# Patient Record
Sex: FEMALE | Race: WHITE | URBAN - METROPOLITAN AREA
[De-identification: names, ages, dates, MRNs, and addresses within clinical notes are randomized per-mention and may not be internally consistent; named-entity substitution may affect disease eponyms.]

---

## 2017-01-30 DIAGNOSIS — Q28.2 AVM (ARTERIOVENOUS MALFORMATION) BRAIN: Primary | ICD-10-CM

## 2017-02-01 ENCOUNTER — CARE COORDINATION (OUTPATIENT)
Dept: NEUROLOGY | Facility: CLINIC | Age: 62
End: 2017-02-01

## 2017-02-01 NOTE — PROGRESS NOTES
Patient scheduled for a cerebral angiogram for radiosurgery on 3/23/17 at 9:00 AM Informed patient: Do not eat after 1:00 AM the morning of the procedure; you may drink clear liquids (includes coffee and tea without dairy and fruit juice) until 7:00 AM or until when the gamma knife nurse instructs (whichever is earlier). You may take your medications with a sip of water the morning of the procedure.    Patient informed of above via  Interpretive Services.     Per Dr. Fernandez - [place] may obtain consent via phone as patient does not need to return to clinic prior to procedure. Per patient - may contact via  or may discuss with her Niece, Jamila, who lives with her. [place] informed.

## 2017-03-23 ENCOUNTER — HOSPITAL ENCOUNTER (OUTPATIENT)
Facility: CLINIC | Age: 62
Discharge: STILL ADMITTED OR ADMINISTRATIVE | End: 2017-03-23
Attending: NEUROLOGICAL SURGERY | Admitting: NEUROLOGICAL SURGERY
Payer: COMMERCIAL

## 2017-03-23 ENCOUNTER — HOSPITAL ENCOUNTER (OUTPATIENT)
Dept: MRI IMAGING | Facility: CLINIC | Age: 62
End: 2017-03-23
Attending: RADIOLOGY
Payer: COMMERCIAL

## 2017-03-23 ENCOUNTER — APPOINTMENT (OUTPATIENT)
Dept: MEDSURG UNIT | Facility: CLINIC | Age: 62
End: 2017-03-23
Attending: RADIOLOGY
Payer: COMMERCIAL

## 2017-03-23 ENCOUNTER — OFFICE VISIT (OUTPATIENT)
Dept: RADIATION ONCOLOGY | Facility: CLINIC | Age: 62
End: 2017-03-23
Attending: RADIOLOGY
Payer: COMMERCIAL

## 2017-03-23 ENCOUNTER — APPOINTMENT (OUTPATIENT)
Dept: INTERVENTIONAL RADIOLOGY/VASCULAR | Facility: CLINIC | Age: 62
End: 2017-03-23
Attending: NEUROLOGICAL SURGERY
Payer: COMMERCIAL

## 2017-03-23 VITALS
DIASTOLIC BLOOD PRESSURE: 58 MMHG | RESPIRATION RATE: 16 BRPM | SYSTOLIC BLOOD PRESSURE: 128 MMHG | HEART RATE: 66 BPM | HEIGHT: 59 IN | OXYGEN SATURATION: 93 %

## 2017-03-23 VITALS
OXYGEN SATURATION: 94 % | TEMPERATURE: 97.5 F | SYSTOLIC BLOOD PRESSURE: 128 MMHG | HEART RATE: 68 BPM | DIASTOLIC BLOOD PRESSURE: 64 MMHG | RESPIRATION RATE: 16 BRPM

## 2017-03-23 VITALS
OXYGEN SATURATION: 93 % | RESPIRATION RATE: 16 BRPM | DIASTOLIC BLOOD PRESSURE: 79 MMHG | SYSTOLIC BLOOD PRESSURE: 152 MMHG | HEART RATE: 83 BPM

## 2017-03-23 DIAGNOSIS — Q28.2 ARTERIOVENOUS MALFORMATION OF BRAIN: Primary | ICD-10-CM

## 2017-03-23 DIAGNOSIS — Q28.2 AVM (ARTERIOVENOUS MALFORMATION) BRAIN: ICD-10-CM

## 2017-03-23 LAB
ANION GAP SERPL CALCULATED.3IONS-SCNC: 9 MMOL/L (ref 3–14)
APTT PPP: 27 SEC (ref 22–37)
CHLORIDE SERPL-SCNC: 107 MMOL/L (ref 94–109)
CO2 SERPL-SCNC: 25 MMOL/L (ref 20–32)
CREAT SERPL-MCNC: 0.56 MG/DL (ref 0.52–1.04)
ERYTHROCYTE [DISTWIDTH] IN BLOOD BY AUTOMATED COUNT: 13 % (ref 10–15)
GFR SERPL CREATININE-BSD FRML MDRD: NORMAL ML/MIN/1.7M2
HCT VFR BLD AUTO: 37.4 % (ref 35–47)
HGB BLD-MCNC: 12.3 G/DL (ref 11.7–15.7)
INR PPP: 1.03 (ref 0.86–1.14)
MCH RBC QN AUTO: 29.3 PG (ref 26.5–33)
MCHC RBC AUTO-ENTMCNC: 32.9 G/DL (ref 31.5–36.5)
MCV RBC AUTO: 89 FL (ref 78–100)
PLATELET # BLD AUTO: 224 10E9/L (ref 150–450)
POTASSIUM SERPL-SCNC: 3.6 MMOL/L (ref 3.4–5.3)
RBC # BLD AUTO: 4.2 10E12/L (ref 3.8–5.2)
SODIUM SERPL-SCNC: 141 MMOL/L (ref 133–144)
WBC # BLD AUTO: 3.6 10E9/L (ref 4–11)

## 2017-03-23 PROCEDURE — C1887 CATHETER, GUIDING: HCPCS

## 2017-03-23 PROCEDURE — 25500064 ZZH RX 255 OP 636: Performed by: RADIOLOGY

## 2017-03-23 PROCEDURE — 80051 ELECTROLYTE PANEL: CPT | Performed by: RADIOLOGY

## 2017-03-23 PROCEDURE — 25000125 ZZHC RX 250: Performed by: RADIOLOGY

## 2017-03-23 PROCEDURE — 77300 RADIATION THERAPY DOSE PLAN: CPT | Performed by: RADIOLOGY

## 2017-03-23 PROCEDURE — 85730 THROMBOPLASTIN TIME PARTIAL: CPT | Performed by: RADIOLOGY

## 2017-03-23 PROCEDURE — 25000128 H RX IP 250 OP 636: Performed by: RADIOLOGY

## 2017-03-23 PROCEDURE — 36224 PLACE CATH CAROTD ART: CPT

## 2017-03-23 PROCEDURE — 27210805 ZZH SHEATH CR4

## 2017-03-23 PROCEDURE — 25500064 ZZH RX 255 OP 636: Performed by: NEUROLOGICAL SURGERY

## 2017-03-23 PROCEDURE — 77295 3-D RADIOTHERAPY PLAN: CPT | Performed by: RADIOLOGY

## 2017-03-23 PROCEDURE — 25000132 ZZH RX MED GY IP 250 OP 250 PS 637: Mod: ZF | Performed by: RADIOLOGY

## 2017-03-23 PROCEDURE — 85610 PROTHROMBIN TIME: CPT | Performed by: RADIOLOGY

## 2017-03-23 PROCEDURE — 77334 RADIATION TREATMENT AID(S): CPT | Performed by: RADIOLOGY

## 2017-03-23 PROCEDURE — C1769 GUIDE WIRE: HCPCS

## 2017-03-23 PROCEDURE — 36415 COLL VENOUS BLD VENIPUNCTURE: CPT | Performed by: RADIOLOGY

## 2017-03-23 PROCEDURE — 27210995 ZZH RX 272: Performed by: RADIOLOGY

## 2017-03-23 PROCEDURE — 40000168 ZZH STATISTIC PP CARE STAGE 3

## 2017-03-23 PROCEDURE — 27210907 ZZH KIT CR9

## 2017-03-23 PROCEDURE — 85027 COMPLETE CBC AUTOMATED: CPT | Performed by: RADIOLOGY

## 2017-03-23 PROCEDURE — 99153 MOD SED SAME PHYS/QHP EA: CPT | Mod: 59

## 2017-03-23 PROCEDURE — 70552 MRI BRAIN STEM W/DYE: CPT

## 2017-03-23 PROCEDURE — 77371 SRS MULTISOURCE: CPT | Performed by: RADIOLOGY

## 2017-03-23 PROCEDURE — 99152 MOD SED SAME PHYS/QHP 5/>YRS: CPT | Mod: 59

## 2017-03-23 PROCEDURE — C1760 CLOSURE DEV, VASC: HCPCS

## 2017-03-23 PROCEDURE — A9585 GADOBUTROL INJECTION: HCPCS | Performed by: RADIOLOGY

## 2017-03-23 PROCEDURE — 82565 ASSAY OF CREATININE: CPT | Performed by: RADIOLOGY

## 2017-03-23 PROCEDURE — T1013 SIGN LANG/ORAL INTERPRETER: HCPCS | Mod: U3,ZF

## 2017-03-23 PROCEDURE — 25000125 ZZHC RX 250: Mod: ZF | Performed by: RADIOLOGY

## 2017-03-23 PROCEDURE — 77370 RADIATION PHYSICS CONSULT: CPT | Performed by: RADIOLOGY

## 2017-03-23 RX ORDER — IODIXANOL 320 MG/ML
150 INJECTION, SOLUTION INTRAVASCULAR ONCE
Status: COMPLETED | OUTPATIENT
Start: 2017-03-23 | End: 2017-03-23

## 2017-03-23 RX ORDER — HYDROCODONE BITARTRATE AND ACETAMINOPHEN 5; 325 MG/1; MG/1
1-2 TABLET ORAL EVERY 6 HOURS PRN
Status: DISCONTINUED | OUTPATIENT
Start: 2017-03-23 | End: 2017-03-23 | Stop reason: HOSPADM

## 2017-03-23 RX ORDER — ONDANSETRON 2 MG/ML
4 INJECTION INTRAMUSCULAR; INTRAVENOUS EVERY 6 HOURS PRN
Status: DISCONTINUED | OUTPATIENT
Start: 2017-03-23 | End: 2017-03-23 | Stop reason: HOSPADM

## 2017-03-23 RX ORDER — PROCHLORPERAZINE 25 MG
25 SUPPOSITORY, RECTAL RECTAL EVERY 12 HOURS PRN
Status: DISCONTINUED | OUTPATIENT
Start: 2017-03-23 | End: 2017-03-23 | Stop reason: HOSPADM

## 2017-03-23 RX ORDER — METOCLOPRAMIDE HYDROCHLORIDE 5 MG/ML
10 INJECTION INTRAMUSCULAR; INTRAVENOUS EVERY 6 HOURS PRN
Status: DISCONTINUED | OUTPATIENT
Start: 2017-03-23 | End: 2017-03-23 | Stop reason: HOSPADM

## 2017-03-23 RX ORDER — GADOBUTROL 604.72 MG/ML
7.5 INJECTION INTRAVENOUS ONCE
Status: COMPLETED | OUTPATIENT
Start: 2017-03-23 | End: 2017-03-23

## 2017-03-23 RX ORDER — LIDOCAINE 40 MG/G
CREAM TOPICAL
Status: DISCONTINUED | OUTPATIENT
Start: 2017-03-23 | End: 2017-03-23 | Stop reason: HOSPADM

## 2017-03-23 RX ORDER — NALOXONE HYDROCHLORIDE 0.4 MG/ML
.1-.4 INJECTION, SOLUTION INTRAMUSCULAR; INTRAVENOUS; SUBCUTANEOUS
Status: DISCONTINUED | OUTPATIENT
Start: 2017-03-23 | End: 2017-03-23 | Stop reason: HOSPADM

## 2017-03-23 RX ORDER — LORAZEPAM 0.5 MG/1
.5-1 TABLET ORAL
Status: DISCONTINUED | OUTPATIENT
Start: 2017-03-23 | End: 2017-03-23 | Stop reason: HOSPADM

## 2017-03-23 RX ORDER — SODIUM CHLORIDE 9 MG/ML
INJECTION, SOLUTION INTRAVENOUS CONTINUOUS
Status: DISCONTINUED | OUTPATIENT
Start: 2017-03-23 | End: 2017-03-23 | Stop reason: HOSPADM

## 2017-03-23 RX ORDER — LIDOCAINE 40 MG/G
1 CREAM TOPICAL SEE ADMIN INSTRUCTIONS
Status: COMPLETED | OUTPATIENT
Start: 2017-03-23 | End: 2017-03-23

## 2017-03-23 RX ORDER — ONDANSETRON 4 MG/1
8 TABLET, ORALLY DISINTEGRATING ORAL EVERY 6 HOURS PRN
Status: DISCONTINUED | OUTPATIENT
Start: 2017-03-23 | End: 2017-03-23 | Stop reason: HOSPADM

## 2017-03-23 RX ORDER — ONDANSETRON 2 MG/ML
4 INJECTION INTRAMUSCULAR; INTRAVENOUS
Status: DISCONTINUED | OUTPATIENT
Start: 2017-03-23 | End: 2017-03-23 | Stop reason: HOSPADM

## 2017-03-23 RX ORDER — LORAZEPAM 0.5 MG/1
.5-2 TABLET ORAL
Status: COMPLETED | OUTPATIENT
Start: 2017-03-23 | End: 2017-03-23

## 2017-03-23 RX ORDER — ACETAMINOPHEN 325 MG/1
650 TABLET ORAL EVERY 4 HOURS PRN
Status: DISCONTINUED | OUTPATIENT
Start: 2017-03-23 | End: 2017-03-23 | Stop reason: HOSPADM

## 2017-03-23 RX ORDER — ONDANSETRON 4 MG/1
4 TABLET, ORALLY DISINTEGRATING ORAL EVERY 6 HOURS PRN
Status: DISCONTINUED | OUTPATIENT
Start: 2017-03-23 | End: 2017-03-23 | Stop reason: HOSPADM

## 2017-03-23 RX ORDER — PROCHLORPERAZINE MALEATE 5 MG
5-10 TABLET ORAL EVERY 6 HOURS PRN
Status: DISCONTINUED | OUTPATIENT
Start: 2017-03-23 | End: 2017-03-23 | Stop reason: HOSPADM

## 2017-03-23 RX ORDER — FENTANYL CITRATE 50 UG/ML
25-50 INJECTION, SOLUTION INTRAMUSCULAR; INTRAVENOUS EVERY 5 MIN PRN
Status: DISCONTINUED | OUTPATIENT
Start: 2017-03-23 | End: 2017-03-23 | Stop reason: HOSPADM

## 2017-03-23 RX ORDER — METOCLOPRAMIDE 10 MG/1
10 TABLET ORAL EVERY 6 HOURS PRN
Status: DISCONTINUED | OUTPATIENT
Start: 2017-03-23 | End: 2017-03-23 | Stop reason: HOSPADM

## 2017-03-23 RX ORDER — FLUMAZENIL 0.1 MG/ML
0.2 INJECTION, SOLUTION INTRAVENOUS
Status: DISCONTINUED | OUTPATIENT
Start: 2017-03-23 | End: 2017-03-23 | Stop reason: HOSPADM

## 2017-03-23 RX ADMIN — LIDOCAINE 1 G: 40 CREAM TOPICAL at 05:45

## 2017-03-23 RX ADMIN — FENTANYL CITRATE 25 MCG: 50 INJECTION, SOLUTION INTRAMUSCULAR; INTRAVENOUS at 10:48

## 2017-03-23 RX ADMIN — MIDAZOLAM 0.25 MG: 1 INJECTION INTRAMUSCULAR; INTRAVENOUS at 10:46

## 2017-03-23 RX ADMIN — LORAZEPAM 1 MG: 0.5 TABLET ORAL at 05:46

## 2017-03-23 RX ADMIN — ACETAMINOPHEN 650 MG: 325 TABLET, FILM COATED ORAL at 15:13

## 2017-03-23 RX ADMIN — IODIXANOL 15 ML: 320 INJECTION, SOLUTION INTRAVASCULAR at 10:38

## 2017-03-23 RX ADMIN — LIDOCAINE HYDROCHLORIDE,EPINEPHRINE BITARTRATE 20 ML: 20; .01 INJECTION, SOLUTION INFILTRATION; PERINEURAL at 05:45

## 2017-03-23 RX ADMIN — GADOBUTROL 7.5 ML: 604.72 INJECTION INTRAVENOUS at 07:30

## 2017-03-23 RX ADMIN — SODIUM CHLORIDE: 9 INJECTION, SOLUTION INTRAVENOUS at 08:41

## 2017-03-23 RX ADMIN — LIDOCAINE HYDROCHLORIDE 10 ML: 10 INJECTION, SOLUTION EPIDURAL; INFILTRATION; INTRACAUDAL; PERINEURAL at 10:48

## 2017-03-23 NOTE — DISCHARGE INSTRUCTIONS
[facility]         Interventional Radiology  Discharge Instructions Post Angiogram (NEURO)    AFTER YOU GO HOME  ? DO NOT drive or operate machinery at home or at work for at least 24 hours  ? If you were given sedation; we recommend that an adult stay with you for the first 24 hours  ? DO relax and take it easy for 24 hours  ? DO drink plenty of fluids  ? DO resume your regular diet, unless otherwise instructed by your Primary Physician  ? DO NOT SMOKE FOR AT LEAST 24 HOURS, if you have been given any medications that were to help you relax or sedate you during your procedure  ? DO NOT drink alcoholic beverages the day of your procedure  ? DO NOT do any strenuous exercise or lifting for at least 2 days following your procedure  ? DO NOT take a bath or shower for at least 12 hours following your procedure  ? DO NOT scrub the procedure site vigorously for 5 days  ? DO NOT make any important or legal decisions for 24 hours following your procedure if you were given sedation    CALL THE PHYSICIAN IF:  ? You start bleeding from the procedure site.  If you do start to bleed from that site, lie down flat and hold pressure on the site.  Your physician will tell you if you need to return to the hospital.  It is common to have a small bruise or lump at the site  ? You have numbness, coolness or change in color of the arm or leg of the puncture site  ? You experience changes in your vision, hearing, balance, coordination, speech, thinking or memory  ? You experience weakness in one or more extremity  ? You experience pain or redness at the puncture site  ? You develop nausea or vomiting  ? You develop hives or a rash or unexplained itching  ? You develop a temperature of 101 degrees F or greater    ADDITIONAL INSTRUCTIONS  ? Support the puncture site for coughing, sneezing, or moving your bowels for the first 48 hours  ? No tub bath, hot tubs, or swimming for 5 days  ? No lotion or powder to the puncture  site for 3 days  ? Instruction booklet given: Star Close Pamphlet    [facility] INTERVENTIONAL RADIOLOGY DEPARTMENT  Procedure Physician:  Dr Boston  Date of procedure: March 23, 2017  Telephone Numbers: [phone] Monday-Friday 8:00 am to 4:30 pm  [phone] After 4:30 pm Monday-Friday, Weekends & Holidays.   Ask for the Neuro-Radiologist on call.  Someone is on call 24 hrs/day  [facility] toll free number: [phone] Monday-Friday 8:00 am to 4:30 pm  [facility] Emergency Dept: [phone]

## 2017-03-23 NOTE — PROGRESS NOTES
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 Surgical resection had a high risk. This was partially embolized in two stages and underwent the first stage of GammaKnife radiosurgery in 12/8/16. Today she is here for second stage of GammaKnife radiosurgery. We will perform a diagnostic angiography to better delineate contours and anatomy of the AVM nidus.     Medical History:  Past Medical History:   Diagnosis Date     AVM (arteriovenous malformation)     right     Hypertension        Surgical History:  Past Surgical History:   Procedure Laterality Date     HYSTERECTOMY       SINUS SURGERY         Family History:  Family History   Problem Relation Age of Onset     Family History Negative Other      AVM     CEREBROVASCULAR DISEASE Mother      Family History Negative Other      cancer       Social History:  Social History     Social History     Marital status:      Spouse name: N/A     Number of children: 3     Years of education: N/A     Occupational History     housewife      Social History Main Topics     Smoking status: Never Smoker     Smokeless tobacco: Not on file     Alcohol use No     Drug use: No     Sexual activity: Not on file     Other Topics Concern     Not on file     Social History Narrative       Allergies:  No Known Allergies    Is there a contrast allergy?  No    Medications:  Prescriptions Prior to Admission   Medication Sig Dispense Refill Last Dose     LevETIRAcetam (KEPPRA PO) Take 1,500 mg by mouth 2 times daily 1.5 tabs daily    3/23/2017 at Unknown time     AMLODIPINE BESYLATE PO Take 5 mg by mouth daily   3/23/2017 at Unknown time     Acetaminophen (TYLENOL PO)    Unknown at Unknown time   .    ROS:  C: NEGATIVE for fever, chills, change in weight  E/M: NEGATIVE for ear, mouth and throat problems  R:  NEGATIVE for significant cough or SOB  CV: NEGATIVE for chest pain, palpitations or peripheral edema    PHYSICAL EXAMINATION  Vital Signs:  B/P: Data Unavailable,  T: Data Unavailable,  P: Data Unavailable,  R: Data Unavailable    Cardio:  RRR  Pulmonary:  no respiratory distress  Abdomen:  soft, non-tender, non-distended    Neurologic  Mental Status:  fully alert, attentive and oriented, follows commands, speech clear and fluent  Cranial Nerves:  visual fields intact, PERRL, EOMI with normal smooth pursuit, facial sensation intact and symmetric, facial movements symmetric, hearing not formally tested but intact to conversation, palate elevation symmetric and uvula midline, no dysarthria, shoulder shrug strong bilaterally, tongue protrusion midline  Motor:  no abnormal movements, normal tone throughout, normal muscle bulk, no pronator drift, normal and symmetric rapid finger tapping, able to move all limbs spontaneously, strength 5/5 both upper extremities. 5/5 in right lower extremity, 4/5 in left lower extremity.   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     PLAN: Diagnostic cerebral angiography for planning of the second stage of the Gammaknife radiosurgery.        PRE-PROCEDURE SEDATION ASSESSMENT     Pre-Procedure  Sedation Assessment done at 0800.    Expected Level:  Minimal Sedation    Indication:  Sedation is required to allow for neurointerventional procedure.    Consent obtained from patient after discussing the risks, benefits and alternatives.     PO Intake:  Appropriately NPO for procedure    ASA Class:  Class 2 - MILD SYSTEMIC DISEASE, NO ACUTE PROBLEMS, NO FUNCTIONAL LIMITATIONS.    Mallampati:  Grade 2:  Soft palate, base of uvula, tonsillar pillars, and portion of posterior pharyngeal wall visible    History and physical reviewed and no updates needed. I have reviewed the lab findings, diagnostic data, medications, and the plan for sedation. I have determined this patient to be an appropriate candidate for the planned sedation and procedure and have reassessed the patient IMMEDIATELY PRIOR to sedation and procedure.    Patient was discussed with the Attending, Dr. Fernandez, who agrees with the plan.    Alejandro Boston   Pager: [phone]      I have reviewed the history.I have seen and examined the patient myself and agree with the assessment and plan above.  ERICA Fernandez MD

## 2017-03-23 NOTE — IP AVS SNAPSHOT
MRN:[id number]                      After Visit Summary   3/23/2017    Kayley Prado    MRN: [id number]           Visit Information        Department      3/23/2017  7:45 AM [address] [facility]          Review of your medicines      UNREVIEWED medicines. Ask your doctor about these medicines        Dose / Directions    AMLODIPINE BESYLATE PO        Dose:  5 mg   Take 5 mg by mouth daily   Refills:  0       KEPPRA PO        Dose:  1500 mg   Take 1,500 mg by mouth 2 times daily 1.5 tabs daily   Refills:  0       TYLENOL PO        Refills:  0                Protect others around you: Learn how to safely use, store and throw away your medicines at www.disposemymeds.org.         Follow-ups after your visit         Care Instructions        Further instructions from your care team       [facility]         Interventional Radiology  Discharge Instructions Post Angiogram (NEURO)    AFTER YOU GO HOME  ? DO NOT drive or operate machinery at home or at work for at least 24 hours  ? If you were given sedation; we recommend that an adult stay with you for the first 24 hours  ? DO relax and take it easy for 24 hours  ? DO drink plenty of fluids  ? DO resume your regular diet, unless otherwise instructed by your Primary Physician  ? DO NOT SMOKE FOR AT LEAST 24 HOURS, if you have been given any medications that were to help you relax or sedate you during your procedure  ? DO NOT drink alcoholic beverages the day of your procedure  ? DO NOT do any strenuous exercise or lifting for at least 2 days following your procedure  ? DO NOT take a bath or shower for at least 12 hours following your procedure  ? DO NOT scrub the procedure site vigorously for 5 days  ? DO NOT make any important or legal decisions for 24 hours following your procedure if you were given sedation    CALL THE PHYSICIAN IF:  ? You start bleeding from the procedure site.  If you do start to bleed from that site, lie  "down flat and hold pressure on the site.  Your physician will tell you if you need to return to the hospital.  It is common to have a small bruise or lump at the site  ? You have numbness, coolness or change in color of the arm or leg of the puncture site  ? You experience changes in your vision, hearing, balance, coordination, speech, thinking or memory  ? You experience weakness in one or more extremity  ? You experience pain or redness at the puncture site  ? You develop nausea or vomiting  ? You develop hives or a rash or unexplained itching  ? You develop a temperature of 101 degrees F or greater    ADDITIONAL INSTRUCTIONS  ? Support the puncture site for coughing, sneezing, or moving your bowels for the first 48 hours  ? No tub bath, hot tubs, or swimming for 5 days  ? No lotion or powder to the puncture site for 3 days  ? Instruction booklet given: Star [employer] Pamphlet    [facility] INTERVENTIONAL RADIOLOGY DEPARTMENT  Procedure Physician:  Dr Boston  Date of procedure: March 23, 2017  Telephone Numbers: [phone] Monday-Friday 8:00 am to 4:30 pm  [phone] After 4:30 pm Monday-Friday, Weekends & Holidays.   Ask for the Neuro-Radiologist on call.  Someone is on call 24 hrs/day  [facility] toll free number: [phone] Monday-Friday 8:00 am to 4:30 pm  [facility] Emergency Dept: [phone]         Additional Information About Your Visit        [employer] Information     [employer] lets you send messages to your doctor, view your test results, renew your prescriptions, schedule appointments and more. To sign up, go to [email or web address] . Click on \"Log in\" on the left side of the screen, which will take you to the Welcome page. Then click on \"Sign up Now\" on the right side of the page.     You will be asked to enter the access code listed below, as well as some personal information. Please follow the directions to create your username and password.     Your access code is: [email or web address]  Expires: 6/21/2017 11:50 AM   " "  Your access code will  in 90 days. If you need help or a new code, please call your [place] clinic or [phone].        Care EveryWhere ID     This is your Care EveryWhere ID. This could be used by other organizations to access your [place] medical records  [id number]        Your Vitals Were     Blood Pressure Pulse Respirations Height Pulse Oximetry       128/63 63 16 1.5 m (4' 11.06\") 93%        Primary Care Provider    Physician No Ref-Primary      Thank you!     Thank you for choosing [place] for your care. Our goal is always to provide you with excellent care. Hearing back from our patients is one way we can continue to improve our services. Please take a few minutes to complete the written survey that you may receive in the mail after you visit with us. Thank you!             Medication List: This is a list of all your medications and when to take them. Check marks below indicate your daily home schedule. Keep this list as a reference.      Medications           Morning Afternoon Evening Bedtime As Needed    AMLODIPINE BESYLATE PO   Take 5 mg by mouth daily                                KEPPRA PO   Take 1,500 mg by mouth 2 times daily 1.5 tabs daily                                TYLENOL PO                                  "

## 2017-03-23 NOTE — PROGRESS NOTES
Pt back from IR s/p cerebral angiogram.  VSS. Right groin site F/D/I.  Neuro's at baseline.  Pt denies any pain.  Family at the bedside.  1220-D/C instructions went over with and given to pt and her family via , all questions answered.  1230-Pt transferred down to  with Rama ALFARO RN-She will get pt up to walk  (Rama is aware that pt is on bedrest until 1240). Pt will be D/C'ed to home by GK.  Pt did not want to eat or drink anything until after the GK treatment.  Family and the  with pt.

## 2017-03-23 NOTE — LETTER
3/23/2017       RE: Kayley Prado  [address]  [facility] [address]     Dear Colleague,    Thank you for referring your patient, Kayley Prado, to the [facility], [place], RADIATION ONCOLOGY. Please see a copy of my visit note below.    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  After sedation and topical anesthetic, the head frame was put on by Dr. Madrigal      The patient was then taken to the Department of Radiology where a stereotactic brain MRI was performed.  The patient was admitted to the  care area  while treatment planning was completed.      These Images were then sent to the [employer] Gamma Knife computer system where a three dimensional stereotactic plan was generated.  Measurements were again taken to confirm accuracy of head ring placement.  The patient was then treated on the [employer] Gamma Knife.  Treatment involved 1 target    Target one was located in R inferior frontal lobe and was treated with 16 Gy  prescribed to the 50%  isodose line.  A total of 27 shots was used with 8 mm helmet size.    The [employer] Gamma Plan software was used to create a highly conformal dose distribution using the number and size collimators detailed above.       TREATMENT:    The patient was brought to the Gamma Knife suite.  The patient was identified by 2 methods. A timeout was performed to confirm the correct patient and correct procedure. The site was identified by an MRI image  and treatment planning software, which defined the treatment area.   The frame measurements were re-taken and compared to the original measurements.      The treatment was delivered using the Model C [employer] Gamma Knife without complication.  The head frame was removed and the patient was discharged home in stable condition.  The patient tolerated the treatment well and had no complications.        FOLLOW-UP PLANS:  The Gamma Knife Nurse Coordinator will call the patient tomorrow for short-term follow up.  Written discharge instructions were given to the patient. The patient will have a follow up brain MRI in 1 year  and those films should be sent to me.  The patient will also follow-up with Dr. Madrigal and Dr. Fernandez.      Approved by:  Kwame Munoz MD    Again, thank you for allowing me to participate in the care of your patient.      Sincerely,    Kwame Munoz MD

## 2017-03-23 NOTE — PROGRESS NOTES
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  After sedation and topical anesthetic, the head frame was put on by Dr. Madrigal      The patient was then taken to the Department of Radiology where a stereotactic brain MRI was performed.  The patient was admitted to the [address] [place] [address] area  while treatment planning was completed.      These Images were then sent to the [employer] Gamma Knife computer system where a three dimensional stereotactic plan was generated.  Measurements were again taken to confirm accuracy of head ring placement.  The patient was then treated on the [employer] Gamma Knife.  Treatment involved 1 target    Target one was located in R inferior frontal lobe and was treated with 16 Gy  prescribed to the 50%  isodose line.  A total of 27 shots was used with 8 mm helmet size.    The [employer] Gamma Plan software was used to create a highly conformal dose distribution using the number and size collimators detailed above.       TREATMENT:    The patient was brought to the Gamma Knife suite.  The patient was identified by 2 methods. A timeout was performed to confirm the correct patient and correct procedure. The site was identified by an MRI image and treatment planning software, which defined the treatment area.   The frame measurements were re-taken and compared to the original measurements.      The treatment was delivered using the Model C [employer] Gamma Knife without complication.  The head  frame was removed and the patient was discharged home in stable condition.  The patient tolerated the treatment well and had no complications.        FOLLOW-UP PLANS:  The Gamma Knife Nurse Coordinator will call the patient tomorrow for short-term follow up.  Written discharge instructions were given to the patient. The patient will have a follow up brain MRI in 1 year  and those films should be sent to me.  The patient will also follow-up with Dr. Madrigal and Dr. Fernandez.      Approved by:  Kwame Munoz MD

## 2017-03-23 NOTE — PROGRESS NOTES
D: Patient arrived at 1010. Table ready at 1000              Kayley Prado underwent a  Cerebral  angiogram  by Dr Fernandez on 3/23/2017   Time out done. Yes               Patient identity confirmed with 2 identifiers  Yes               Site marked  No              Support staff present for case :Dr. Boston arrived at 1000                                                           RN SAMANTA Reyes, arrived at 1000                                                           Tech K. Blumberg, ANTHONY Lucia and ANTHONY Gastelum  arrived at 1000    A:  Patient moved to table, monitoring equipment applied. Insertion site prepared by technologist.    Start time of procedure: 1015   Puncture made to : RFA    At  1016       Sedation Medication given: Yes, see MAR                Medication total dose given- Versed  0.25 mg                                                        Fentanyl  25  Mcg                                                                                                                    IR RN Monitoring Times: Start:1015                                                     Stop:1040     Introducer removed,    Closure device deployed   StarClose   Groin site appearance : Flat/ dry   End time of procedure : 1035       R:  Patient  Tolerated procedure well, all neuro's and pulses baseline.       P:  Patient to recover in 2A     Post Procedure Education:  The following information has been reviewed with patient with interpretor   1. Maintain bedrest with right lower extremity straight until 1240   2. Notify nurse immediately if having any bleeding from site, any changes in sensation,or changes in strength.   3. Notify nurse if you have to go the bathroom, the staff will assist you.   4. Nurses will be doing frequent assessments post procedure, which will include                   checking the pulses in your feet, groin/access site, vital signs, and neuro exam.    5. Please press your call light if you, or your family, have  any questions or concerns        regarding your care.    Learner's response to angiogram education:  [patient needs reinforcement due to sedation

## 2017-03-23 NOTE — IP AVS SNAPSHOT
[address] [address]                                       After Visit Summary   3/23/2017    Kayley Prado    MRN: [id number]           After Visit Summary Signature Page     I have received my discharge instructions, and my questions have been answered. I have discussed any challenges I see with this plan with the nurse or doctor.    ..........................................................................................................................................  Patient/Patient Representative Signature      ..........................................................................................................................................  Patient Representative Print Name and Relationship to Patient    ..................................................               ................................................  Date                                            Time    ..........................................................................................................................................  Reviewed by Signature/Title    ...................................................              ..............................................  Date                                                            Time

## 2017-03-23 NOTE — MR AVS SNAPSHOT
After Visit Summary   3/23/2017    Kayley Prado    MRN: [id number]           Patient Information     Date Of Birth          1955        Visit Information        Provider Department      3/23/2017 5:30 AM Tatiana Bhatt; Norma Calderon; Janes Madrigal MD; Kwame Munoz MD [facility], [place], Radiation Oncology        Today's Diagnoses     Arteriovenous malformation of brain    -  1       Follow-ups after your visit        Future tests that were ordered for you today     Open Standing Orders        Priority Remaining Interval Expires Ordered    Oxygen: Nasal cannula Routine 65242/21085 PRN  3/23/2017    If Patient Diabetic: Glucose monitor nursing POCT Routine [id number]/[id number] PRN 3/24/2017 3/23/2017            Who to contact     Please call your clinic at [phone] to:    Ask questions about your health    Make or cancel appointments    Discuss your medicines    Learn about your test results    Speak to your doctor   If you have compliments or concerns about an experience at your clinic, or if you wish to file a complaint, please contact [facility] Patient Relations at [phone] or email us at [email or web address]         Additional Information About Your Visit        MyChart Information     [employer] is an electronic gateway that provides easy, online access to your medical records. With [employer], you can request a clinic appointment, read your test results, renew a prescription or communicate with your care team.     To sign up for WHOOPt visit the website at [email or web address]   You will be asked to enter the access code listed below, as well as some personal information. Please follow the directions to create your username and password.     Your access code is: [email or web address]  Expires: 2017 11:50 AM     Your access code will  in 90 days. If you need help or a new code, please contact your [facility]  Clinic or call [phone] for assistance.        Care EveryWhere ID     This is your Care EveryWhere ID. This could be used by other organizations to access your [place] medical records  [id number]        Your Vitals Were     Pulse Respirations Pulse Oximetry             71 16 95%          Blood Pressure from Last 3 Encounters:   03/23/17 128/58   03/23/17 128/64   03/23/17 138/58    Weight from Last 3 Encounters:   12/08/16 56.7 kg (125 lb)   12/05/16 54.4 kg (120 lb)              We Performed the Following     Activity: Up ad duncan     Creatinine (if diagnosis of AVM, IV contrast CT required, hx of renal problems, or > age 60)     Electrolyte panel (If > age 60 or hx of being on diuretic, digoxin, or hx of seizures)     IV access: Insert Peripheral IV Catheter (needle no smaller than 22 gauge)     NPO Ice Chips (prior to head frame placement)     Vital Signs and Pain Assessment          Today's Medication Changes      Notice     This visit is during an admission. Changes to the med list made in this visit will be reflected in the After Visit Summary of the admission.             Primary Care Provider    Physician No Ref-Primary       No address on file        Thank you!     Thank you for choosing [facility], RADIATION ONCOLOGY  for your care. Our goal is always to provide you with excellent care. Hearing back from our patients is one way we can continue to improve our services. Please take a few minutes to complete the written survey that you may receive in the mail after your visit with us. Thank you!             Your Updated Medication List - Protect others around you: Learn how to safely use, store and throw away your medicines at www.disposemymeds.org.      Notice     This visit is during an admission. Changes to the med list made in this visit will be reflected in the After Visit Summary of the admission.

## 2017-03-27 ENCOUNTER — CARE COORDINATION (OUTPATIENT)
Dept: NEUROLOGY | Facility: CLINIC | Age: 62
End: 2017-03-27

## 2017-04-10 ENCOUNTER — TELEPHONE (OUTPATIENT)
Dept: NEUROSURGERY | Facility: CLINIC | Age: 62
End: 2017-04-10

## 2017-04-10 NOTE — TELEPHONE ENCOUNTER
Called patient to let her know that Dr. Madrigal does not need to see her at any specific time for follow-up. She will continue with plan to follow-up with Dr. Fernandez and if there are any concerns or reason to involve Dr. Madrigal, she would see him at that point.     Patient was encouraged to call with further questions or concerns.     Haydee Herbert RN      ----- Message from Janes Madrigal MD sent at 4/9/2017  2:53 PM CDT -----  Regarding: RE: follow-up  Her follow-up should be with Dr. Fernandez. I will see her at his requests if he has concerns.  ----- Message -----     From: Haydee Herbert RN     Sent: 3/30/2017   9:56 AM       To: Janes Madrigal MD  Subject: follow-up                                        Dr. Madrigal,   When would you like to see this patient back for follow-up after Gamma Knife? She has history of  Grade III right paramedian frontoparietal AVM. She had her first stage of GammaKnife radiosurgery on 12/8/16 and second on 3/23/17.     Haydee Sampson   ----- Message -----     From: Antonina Koch RN     Sent: 3/27/2017   2:12 PM       To: MAYDA Cordova,  Please contact patient and let her know (or rony Marino) when she needs to follow-up with Dr. Madrigal post GKT. She will follow-up with Dr. Fernandez in 1 year with MRI. Jamila is aware of this. Please let me know if you have questions.  ThanksChirag

## 2017-04-18 NOTE — OP NOTE
DATE OF PROCEDURE:  03/23/2017       PREOPERATIVE HISTORY:  Ms. Kayley Prado is known to us because of a large arteriovenous malformation which Dr. Fernandez recommended should be treated by stereotactic radiosurgery.  She had first stage of treatment done on 12/08/2016 in which the upper portion of the AVM was treated with 18 Gy to the 50% isodose line.  She has tolerated the procedure well and now returns for completion of the treatment with treatment of the lower portion of the AVM.      PREOPERATIVE DIAGNOSIS:  Arteriovenous malformation of the brain.      POSTOPERATIVE DIAGNOSIS:  Arteriovenous malformation of the brain.      PROCEDURES:  1. Placement of Leksell Stereotactic Head Frame  2. Stereotactic MRI of the Brain  3. Generation of Stereotactic Treatment Plan  4. Gamma Knife Treatment     NEUROSURGEON:  Janes Madrigal MD      NEURO INTERVENTIONALIST:  Jyothi Fernandez MD        RADIATION ONCOLOGIST:  Kwame Munoz MD      RADIATION PHYSICIST:  Marion Mcfarland, PhD      DESCRIPTION OF PROCEDURE:      After obtaining informed consent, the patient was brought to the Gamma Knife Suite and correctly identified.  1 mg of oral Ativan was administered for head frame placement.  The Leksell stereotactic head frame was then secured to the patient s head using four pins. The intended pin sites were prepared in the usual sterile fashion and infiltrated with buffered 1% lidocaine with epinephrine (1:100,000).  Care was taken not to over-tighten the pins, and therefore avoid torque of the frame.  Accurate and secure placement of the frame was verified.  Skull geometry measurements were obtained and verified, and a fiducial localizer box was affixed to the head frame.    The patient was then transferred to the MRI scanner, and a stereotactic MRI of the brain was obtained.    The patient then went to the angiography suite where Dr. Fernandez performed a stereotactic angiogram. The MRI and angiogram images  were transferred to the Gamma Knife treatment planning station, and a three-dimensional stereotactic treatment plan was generated as follows:    We had contoured the higher lesion during the December treatment.  This plan was brought forward.  We created a new plan contoured based on the angiogram and the MRI obtained on the day of the second treatment.  The angiogram and MRI were compared and reconciled, and the previous treatment plan was compared and reconciled as well.  Once the neuro interventional team, neurosurgery team and the radiation oncology team agreed on the proposed volume of treatment, a conformal plan was devised using 27 shots with the 8 mm collimator.     Dr. Munoz prescribed 16 Gy to the 50% isodose line.       The plan was turned over to Dr. Mcfarland and the physics team for  and administration. The patient returned to the Gamma Knife suite and the treatment was administered.    At the completion of treatment, the patient was taken out of the treatment unit, and the stereotactic head frame removed.  The pin sites were inspected for integrity.  A sterile dressing was applied and the patient was given discharge instructions and a plan for follow-up.       I, Janes Madrigal MD, was present for the neurosurgical portions of the procedure.         JANES MADRIGAL MD             D: 2017 15:54   T: 2017 05:16   MT: donnie      Name:     LUKE BERG   MRN:      [id number]        Account:        [id number]   :      1955           Procedure Date: 2017      Document: [id number]

## 2017-11-03 DIAGNOSIS — Q28.2 AVM (ARTERIOVENOUS MALFORMATION) BRAIN: Primary | ICD-10-CM

## 2019-08-16 ENCOUNTER — TELEPHONE (OUTPATIENT)
Dept: NEUROLOGY | Facility: CLINIC | Age: 64
End: 2019-08-16

## 2019-08-16 NOTE — TELEPHONE ENCOUNTER
Patient s/p staged GKT for treatment of AVM - lost to follow-up, due 3/2018.     [facility] for patient to return call via . Antonina Koch RN 8/16/2019 11:55 AM

## 2019-09-18 ENCOUNTER — TELEPHONE (OUTPATIENT)
Dept: NEUROLOGY | Facility: CLINIC | Age: 64
End: 2019-09-18

## 2019-09-18 NOTE — TELEPHONE ENCOUNTER
Patient s/p staged GKT (12/2016, 3/2017) for treatment of AVM - lost to follow-up, due 3/2018.     Left message with Jamila Garcia via  to have patient return call.

## 2019-09-25 NOTE — TELEPHONE ENCOUNTER
Message sent to Dr. Fernandez to confirm imaging needed.     Dr. Fernandez is in agreement with angio follow-up.

## 2019-09-30 NOTE — TELEPHONE ENCOUNTER
Call placed via . Per party answering the phone patient is on vacation in [facility], and is uncertain of when she will return. Will try again in a few weeks (message sent).

## 2019-10-23 ENCOUNTER — TELEPHONE (OUTPATIENT)
Dept: NEUROLOGY | Facility: CLINIC | Age: 64
End: 2019-10-23

## 2019-10-23 NOTE — TELEPHONE ENCOUNTER
Patient s/p staged GKT (12/2016, 3/2017) for treatment of AVM - lost to follow-up, due 3/2018. Per Dr. Fernandez will do angiogram.     [facility] via  to return call. Antonina Koch RN 10/23/2019 11:30 AM

## 2020-01-23 NOTE — PROGRESS NOTES
Neuro-Interventional Discharge Coordination Note     Responsible Attending physician: Dr. Fernandez     Operation performed: Diagnostic cerebral angiogram for second stage of GammaKnife radiosurgery    Date of Discharge: 3/23/17    Discharge to: Home    Current Contact number: [phone]    Current Status: Per Niece, Jamila, patient is doing well. No concerns. She has a headache. Not sleeping well the first few days. Slept better last night. Denies s/s of infection at puncture site. Jamila and patient have my contact information and will call with questions. She is aware of the plan.     Plan: Follow-up with Dr. Madrigal as advised. Follow-up with Dr. Fernandez in 1 year with MRI prior to appointment. Will contact patient in ~ 9 months to schedule. Message sent to Dr. Madrigal Care Coordinator to schedule follow-up.      Epidermal Closure Graft Donor Site (Optional): simple interrupted
